# Patient Record
Sex: MALE | Race: WHITE | NOT HISPANIC OR LATINO | Employment: FULL TIME | URBAN - METROPOLITAN AREA
[De-identification: names, ages, dates, MRNs, and addresses within clinical notes are randomized per-mention and may not be internally consistent; named-entity substitution may affect disease eponyms.]

---

## 2018-08-11 ENCOUNTER — OFFICE VISIT (OUTPATIENT)
Dept: URGENT CARE | Facility: CLINIC | Age: 63
End: 2018-08-11

## 2018-08-11 VITALS
SYSTOLIC BLOOD PRESSURE: 140 MMHG | TEMPERATURE: 98 F | BODY MASS INDEX: 34.71 KG/M2 | WEIGHT: 300 LBS | DIASTOLIC BLOOD PRESSURE: 80 MMHG | HEART RATE: 64 BPM | RESPIRATION RATE: 18 BRPM | OXYGEN SATURATION: 100 % | HEIGHT: 78 IN

## 2018-08-11 DIAGNOSIS — S01.01XA LACERATION OF SCALP, INITIAL ENCOUNTER: Primary | ICD-10-CM

## 2018-08-11 DIAGNOSIS — Z23 NEED FOR DIPHTHERIA-TETANUS-PERTUSSIS (TDAP) VACCINE: ICD-10-CM

## 2018-08-11 PROCEDURE — G0382 LEV 3 HOSP TYPE B ED VISIT: HCPCS | Performed by: EMERGENCY MEDICINE

## 2018-08-11 PROCEDURE — 90715 TDAP VACCINE 7 YRS/> IM: CPT

## 2018-08-11 RX ORDER — CELECOXIB 200 MG/1
200 CAPSULE ORAL 2 TIMES DAILY
COMMUNITY

## 2018-08-11 RX ORDER — AMLODIPINE AND VALSARTAN 5; 160 MG/1; MG/1
1 TABLET ORAL 2 TIMES DAILY
COMMUNITY

## 2018-08-11 RX ORDER — ASPIRIN 81 MG/1
81 TABLET ORAL DAILY
COMMUNITY

## 2018-08-11 RX ORDER — CEPHALEXIN 500 MG/1
500 CAPSULE ORAL EVERY 8 HOURS SCHEDULED
Qty: 15 CAPSULE | Refills: 0 | Status: SHIPPED | OUTPATIENT
Start: 2018-08-11 | End: 2018-08-16

## 2018-08-11 RX ORDER — SIMVASTATIN 40 MG
20 TABLET ORAL
COMMUNITY

## 2018-08-11 RX ORDER — OMEPRAZOLE 40 MG/1
40 CAPSULE, DELAYED RELEASE ORAL DAILY
COMMUNITY

## 2018-08-11 NOTE — PROGRESS NOTES
[facility] [facility]        NAME: Kendall Sandoval is a 61 y o  male  : 1955    MRN: [id number]  DATE: 2018  TIME: 4:29 PM    Assessment and Plan   Need for diphtheria-tetanus-pertussis (Tdap) vaccine [Z23]  1  Need for diphtheria-tetanus-pertussis (Tdap) vaccine  TDAP Vaccine greater than or equal to 6yo   2  Laceration of scalp, initial encounter  cephalexin (KEFLEX) 500 mg capsule     Laceration repair  Date/Time: 2018 4:27 PM  Performed by: Mary Leal  Authorized by: Mary Leal   Risks and benefits: risks, benefits and alternatives were discussed  Consent given by: patient  Patient understanding: patient states understanding of the procedure being performed  Patient consent: the patient's understanding of the procedure matches consent given  Procedure consent: procedure consent matches procedure scheduled  Patient identity confirmed: verbally with patient  Body area: head/neck  Location details: scalp  Laceration length: 2 5 cm  Foreign bodies: no foreign bodies  Tendon involvement: superficial  Nerve involvement: none  Vascular damage: no  Anesthesia: local infiltration    Anesthesia:  Local Anesthetic: lidocaine 1% with epinephrine  Anesthetic total: 5 mL    Sedation:  Patient sedated: no    Wound Dehiscence:  Superficial Wound Dehiscence: simple closure      Procedure Details:  Irrigation solution: saline  Amount of cleaning: standard  Debridement: minimal  Degree of undermining: none  Skin closure: staples  Number of sutures: 7 (staples)  Approximation: close  Approximation difficulty: simple  Dressing: antibiotic ointment and pressure dressing  Patient tolerance: Patient tolerated the procedure well with no immediate complications          Patient Instructions     Patient Instructions   Head Injury   AMBULATORY CARE:   A head injury  is most often caused by a blow to the head   This may occur from a fall, bicycle injury, sports injury, being struck in the head, or a motor vehicle accident  Signs and symptoms: You may have an open wound, swelling, or bruising on your head  Right after the injury, you may be confused  Symptoms may last anywhere from a few hours to a few weeks  You may have any of the following:  · Mild to moderate headache    · Dizziness or loss of balance    · Nausea or vomiting    · Change in mood, such as feeling restless or irritable    · Trouble thinking, remembering, or concentrating    · Ringing in the ears or neck pain    · Drowsiness or decreased amount of energy    · Trouble sleeping  Call 911 or have someone else call for any of the following:   · You cannot be woken  · You have a seizure  · You stop responding to others or you faint  · You have blurry or double vision  · Your speech becomes slurred or confused  · You have arm or leg weakness, loss of feeling, or new problems with coordination  · Your pupils are larger than usual or one pupil is a different size than the other  · You have blood or clear fluid coming out of your ears or nose  Seek care immediately if:   · You have repeated or forceful vomiting  · You feel confused  · Your headache gets worse or becomes severe  · You or someone caring for you notices that you are harder to wake than usual   Contact your healthcare provider if:   · Your symptoms last longer than 6 weeks after the injury  · You have questions or concerns about your condition or care  Medicines:   · Acetaminophen  decreases pain  Acetaminophen is available without a doctor's order  Ask how much to take and how often to take it  Follow directions  Acetaminophen can cause liver damage if not taken correctly  · Take your medicine as directed  Contact your healthcare provider if you think your medicine is not helping or if you have side effects  Tell him or her if you are allergic to any medicine  Keep a list of the medicines, vitamins, and herbs you take   Include the amounts, and when and why you take them  Bring the list or the pill bottles to follow-up visits  Carry your medicine list with you in case of an emergency  Self-care:   · Rest  or do quiet activities for 24 to 48 hours  Limit your time watching TV, using the computer, or doing tasks that require a lot of thinking  Slowly return to your normal activities as directed  Do not play sports or do activities that may cause you to get hit in the head  Ask your healthcare provider when you can return to sports  · Apply ice  on your head for 15 to 20 minutes every hour or as directed  Use an ice pack, or put crushed ice in a plastic bag  Cover it with a towel before you apply it to your skin  Ice helps prevent tissue damage and decreases swelling and pain  · Have someone stay with you for 24 hours  or as directed  This person can monitor you for complications and call [phone]  When you are awake the person should ask you a few questions to see if you are thinking clearly  An example would be to ask your name or your address  Prevent another head injury:   · Wear a helmet that fits properly  Do this when you play sports, or ride a bike, scooter, or skateboard  Helmets help decrease your risk of a serious head injury  Talk to your healthcare provider about other ways you can protect yourself if you play sports  · Wear your seat belt every time you are in a car  This helps to decrease your risk for a head injury if you are in a car accident  Follow up with your healthcare provider as directed:  Write down your questions so you remember to ask them during your visits  © 2017 [employer] Information is for End User's use only and may not be sold, redistributed or otherwise used for commercial purposes  All illustrations and images included in CareNotes® are the copyrighted property of [employer]  or [employer]  The above information is an  only   It is not intended as medical advice for individual conditions or treatments  Talk to your doctor, nurse or pharmacist before following any medical regimen to see if it is safe and effective for you  Laceration   AMBULATORY CARE:   A laceration  is an injury to the skin and the soft tissue underneath it  Lacerations happen when you are cut or hit by something  They can happen anywhere on the body  Common symptoms include the following:   · Injury or wound to skin and tissue of any shape size that looks like a cut, tear, or gash    · Edges of the wound may be close together or wide apart    · Pain, bleeding, bruising, or swelling    · Numbness around the wound    · Decreased movement in an area below the wound  Seek care immediately if:   · You have heavy bleeding or bleeding that does not stop after 10 minutes of holding firm, direct pressure over the wound  · Your wound opens up  Contact your healthcare provider if:   · You have a fever or chills  · Your laceration is red, warm, or swollen  · You have red streaks on your skin coming from your wound  · You have white or yellow drainage from the wound that smells bad  · You have pain that gets worse, even after treatment  · You have questions or concerns about your condition or care  Treatment for a laceration  includes care to stop any bleeding  Your healthcare provider will stop the bleeding by applying pressure to the wound  He may need to check your wound for foreign objects and clean it to decrease the chance of infection  Your laceration may be closed with stitches, staples, tissue glue, or medical strips  Ask your healthcare provider if you need a tetanus shot  Medicines:   · Prescription pain medicine  may be given  Ask how to take this medicine safely  · Antibiotics  help treat or prevent a bacterial infection  · Take your medicine as directed  Contact your healthcare provider if you think your medicine is not helping or if you have side effects   Tell him or her if you are allergic to any medicine  Keep a list of the medicines, vitamins, and herbs you take  Include the amounts, and when and why you take them  Bring the list or the pill bottles to follow-up visits  Carry your medicine list with you in case of an emergency  Care for your wound as directed:   · Do not get your wound wet  until your healthcare provider says it is okay  Do not soak your wound in water  Do not go swimming until your healthcare provider says it is okay  Carefully wash the wound with soap and water  Gently pat the area dry or allow it to air dry  · Change your bandages  when they get wet, dirty, or after washing  Apply new, clean bandages as directed  Do not apply elastic bandages or tape too tight  Do not put powders or lotions over your incision  · Apply antibiotic ointment as directed  Your healthcare provider may give you antibiotic ointment to put over your wound if you have stitches  If you have strips of tape over your incision, let them dry up and fall off on their own  If they do not fall off within 14 days, gently remove them  If you have glue over your wound, do not remove or pick at it  If your glue comes off, do not replace it with glue that you have at home  · Check your wound every day for signs of infection such as swelling, redness, or pus  Self-care:   · Apply ice  on your wound for 15 to 20 minutes every hour or as directed  Use an ice pack, or put crushed ice in a plastic bag  Cover it with a towel  Ice helps prevent tissue damage and decreases swelling and pain  · Use a splint as directed  A splint will decrease movement and stress on your wound  It may help it heal faster  A splint may be used for lacerations over joints or areas of your body that bend  Ask your healthcare provider how to apply and remove a splint  · Decrease scarring of your wound  by applying ointments as directed   Do not apply ointments until your healthcare provider says it is okay  You may need to wait until your wound is healed  Ask which ointment to buy and how often to use it  After your wound is healed, use sunscreen over the area when you are out in the sun  You should do this for at least 6 months to 1 year after your injury  Follow up with your healthcare provider as directed:  Write down your questions so you remember to ask them during your visits  © 2017 [employer] Information is for End User's use only and may not be sold, redistributed or otherwise used for commercial purposes  All illustrations and images included in CareNotes® are the copyrighted property of [employer]  or [employer]  The above information is an  only  It is not intended as medical advice for individual conditions or treatments  Talk to your doctor, nurse or pharmacist before following any medical regimen to see if it is safe and effective for you  Follow up with PCP in 3-5 days  Proceed to  ER if symptoms worsen  Chief Complaint     Chief Complaint   Patient presents with    Head Laceration     hit top of head on tracking for a garage and sustained a laceration  no loc         History of Present Illness       Patient complains of laceration to the top of his head after he struck it against a in over hanging garage door track 1 hr ago  No headache no loss of consciousness no neck pain  Review of Systems   Review of Systems   Constitutional: Negative for chills and fever  Musculoskeletal: Negative for arthralgias and joint swelling  Skin: Positive for wound  Negative for color change and rash  Neurological: Negative for numbness           Current Medications       Current Outpatient Prescriptions:     amLODIPine-valsartan (EXFORGE) 5-160 MG per tablet, Take 1 tablet by mouth 2 (two) times a day, Disp: , Rfl:     aspirin (ECOTRIN LOW STRENGTH) 81 mg EC tablet, Take 81 mg by mouth daily, Disp: , Rfl:     celecoxib (CeleBREX) 200 mg capsule, Take 200 mg by mouth 2 (two) times a day, Disp: , Rfl:     omeprazole (PriLOSEC) 40 MG capsule, Take 40 mg by mouth daily, Disp: , Rfl:     simvastatin (ZOCOR) 40 mg tablet, Take 20 mg by mouth daily at bedtime  , Disp: , Rfl:     cephalexin (KEFLEX) 500 mg capsule, Take 1 capsule (500 mg total) by mouth every 8 (eight) hours for 5 days, Disp: 15 capsule, Rfl: 0    Current Allergies     Allergies as of 08/11/2018 - Reviewed 08/11/2018   Allergen Reaction Noted    Iodine Hives 08/11/2018    Shellfish-derived products Hives 08/11/2018            The following portions of the patient's history were reviewed and updated as appropriate: allergies, current medications, past family history, past medical history, past social history, past surgical history and problem list      Past Medical History:   Diagnosis Date    Arthritis     GERD (gastroesophageal reflux disease)     High cholesterol     Hypertension        Past Surgical History:   Procedure Laterality Date    HERNIA REPAIR      KNEE ARTHROSCOPY      x4    REPLACEMENT TOTAL KNEE Left        Family History   Problem Relation Age of Onset    Heart disease Mother     Diabetes Father          Medications have been verified  Objective   /80   Pulse 64   Temp 98 °F (36 7 °C) (Tympanic)   Resp 18   Ht 6' 8" (2 032 m)   Wt 136 kg (300 lb)   SpO2 100%   BMI 32 96 kg/m²        Physical Exam     Physical Exam   Constitutional: He is oriented to person, place, and time  He appears well-developed and well-nourished  No distress  Neck: Neck supple  Cardiovascular: Normal rate and regular rhythm  Pulmonary/Chest: Effort normal and breath sounds normal    Musculoskeletal: He exhibits no tenderness  Neurological: He is alert and oriented to person, place, and time  Skin: Skin is warm and dry  No rash noted  There is erythema  Nursing note and vitals reviewed

## 2018-08-11 NOTE — PATIENT INSTRUCTIONS
Head Injury   AMBULATORY CARE:   A head injury  is most often caused by a blow to the head  This may occur from a fall, bicycle injury, sports injury, being struck in the head, or a motor vehicle accident  Signs and symptoms: You may have an open wound, swelling, or bruising on your head  Right after the injury, you may be confused  Symptoms may last anywhere from a few hours to a few weeks  You may have any of the following:  · Mild to moderate headache    · Dizziness or loss of balance    · Nausea or vomiting    · Change in mood, such as feeling restless or irritable    · Trouble thinking, remembering, or concentrating    · Ringing in the ears or neck pain    · Drowsiness or decreased amount of energy    · Trouble sleeping  Call 911 or have someone else call for any of the following:   · You cannot be woken  · You have a seizure  · You stop responding to others or you faint  · You have blurry or double vision  · Your speech becomes slurred or confused  · You have arm or leg weakness, loss of feeling, or new problems with coordination  · Your pupils are larger than usual or one pupil is a different size than the other  · You have blood or clear fluid coming out of your ears or nose  Seek care immediately if:   · You have repeated or forceful vomiting  · You feel confused  · Your headache gets worse or becomes severe  · You or someone caring for you notices that you are harder to wake than usual   Contact your healthcare provider if:   · Your symptoms last longer than 6 weeks after the injury  · You have questions or concerns about your condition or care  Medicines:   · Acetaminophen  decreases pain  Acetaminophen is available without a doctor's order  Ask how much to take and how often to take it  Follow directions  Acetaminophen can cause liver damage if not taken correctly  · Take your medicine as directed    Contact your healthcare provider if you think your medicine is not helping or if you have side effects  Tell him or her if you are allergic to any medicine  Keep a list of the medicines, vitamins, and herbs you take  Include the amounts, and when and why you take them  Bring the list or the pill bottles to follow-up visits  Carry your medicine list with you in case of an emergency  Self-care:   · Rest  or do quiet activities for 24 to 48 hours  Limit your time watching TV, using the computer, or doing tasks that require a lot of thinking  Slowly return to your normal activities as directed  Do not play sports or do activities that may cause you to get hit in the head  Ask your healthcare provider when you can return to sports  · Apply ice  on your head for 15 to 20 minutes every hour or as directed  Use an ice pack, or put crushed ice in a plastic bag  Cover it with a towel before you apply it to your skin  Ice helps prevent tissue damage and decreases swelling and pain  · Have someone stay with you for 24 hours  or as directed  This person can monitor you for complications and call [phone]  When you are awake the person should ask you a few questions to see if you are thinking clearly  An example would be to ask your name or your address  Prevent another head injury:   · Wear a helmet that fits properly  Do this when you play sports, or ride a bike, scooter, or skateboard  Helmets help decrease your risk of a serious head injury  Talk to your healthcare provider about other ways you can protect yourself if you play sports  · Wear your seat belt every time you are in a car  This helps to decrease your risk for a head injury if you are in a car accident  Follow up with your healthcare provider as directed:  Write down your questions so you remember to ask them during your visits  © 2017 [employer] Information is for End User's use only and may not be sold, redistributed or otherwise used for commercial purposes   All illustrations and images included in [employer] are the copyrighted property of [employer]  or [employer]  The above information is an  only  It is not intended as medical advice for individual conditions or treatments  Talk to your doctor, nurse or pharmacist before following any medical regimen to see if it is safe and effective for you  Laceration   AMBULATORY CARE:   A laceration  is an injury to the skin and the soft tissue underneath it  Lacerations happen when you are cut or hit by something  They can happen anywhere on the body  Common symptoms include the following:   · Injury or wound to skin and tissue of any shape size that looks like a cut, tear, or gash    · Edges of the wound may be close together or wide apart    · Pain, bleeding, bruising, or swelling    · Numbness around the wound    · Decreased movement in an area below the wound  Seek care immediately if:   · You have heavy bleeding or bleeding that does not stop after 10 minutes of holding firm, direct pressure over the wound  · Your wound opens up  Contact your healthcare provider if:   · You have a fever or chills  · Your laceration is red, warm, or swollen  · You have red streaks on your skin coming from your wound  · You have white or yellow drainage from the wound that smells bad  · You have pain that gets worse, even after treatment  · You have questions or concerns about your condition or care  Treatment for a laceration  includes care to stop any bleeding  Your healthcare provider will stop the bleeding by applying pressure to the wound  He may need to check your wound for foreign objects and clean it to decrease the chance of infection  Your laceration may be closed with stitches, staples, tissue glue, or medical strips  Ask your healthcare provider if you need a tetanus shot  Medicines:   · Prescription pain medicine  may be given  Ask how to take this medicine safely       · Antibiotics  help treat or prevent a bacterial infection  · Take your medicine as directed  Contact your healthcare provider if you think your medicine is not helping or if you have side effects  Tell him or her if you are allergic to any medicine  Keep a list of the medicines, vitamins, and herbs you take  Include the amounts, and when and why you take them  Bring the list or the pill bottles to follow-up visits  Carry your medicine list with you in case of an emergency  Care for your wound as directed:   · Do not get your wound wet  until your healthcare provider says it is okay  Do not soak your wound in water  Do not go swimming until your healthcare provider says it is okay  Carefully wash the wound with soap and water  Gently pat the area dry or allow it to air dry  · Change your bandages  when they get wet, dirty, or after washing  Apply new, clean bandages as directed  Do not apply elastic bandages or tape too tight  Do not put powders or lotions over your incision  · Apply antibiotic ointment as directed  Your healthcare provider may give you antibiotic ointment to put over your wound if you have stitches  If you have strips of tape over your incision, let them dry up and fall off on their own  If they do not fall off within 14 days, gently remove them  If you have glue over your wound, do not remove or pick at it  If your glue comes off, do not replace it with glue that you have at home  · Check your wound every day for signs of infection such as swelling, redness, or pus  Self-care:   · Apply ice  on your wound for 15 to 20 minutes every hour or as directed  Use an ice pack, or put crushed ice in a plastic bag  Cover it with a towel  Ice helps prevent tissue damage and decreases swelling and pain  · Use a splint as directed  A splint will decrease movement and stress on your wound  It may help it heal faster  A splint may be used for lacerations over joints or areas of your body that bend   Ask your healthcare provider how to apply and remove a splint  · Decrease scarring of your wound  by applying ointments as directed  Do not apply ointments until your healthcare provider says it is okay  You may need to wait until your wound is healed  Ask which ointment to buy and how often to use it  After your wound is healed, use sunscreen over the area when you are out in the sun  You should do this for at least 6 months to 1 year after your injury  Follow up with your healthcare provider as directed:  Write down your questions so you remember to ask them during your visits  © 2017 [address] [employer] Information is for End User's use only and may not be sold, redistributed or otherwise used for commercial purposes  All illustrations and images included in CareNotes® are the copyrighted property of [employer]  or [employer]  The above information is an  only  It is not intended as medical advice for individual conditions or treatments  Talk to your doctor, nurse or pharmacist before following any medical regimen to see if it is safe and effective for you

## 2023-02-28 ENCOUNTER — HOSPITAL ENCOUNTER (EMERGENCY)
Facility: HOSPITAL | Age: 68
Discharge: HOME | End: 2023-02-28
Attending: EMERGENCY MEDICINE

## 2023-02-28 VITALS
SYSTOLIC BLOOD PRESSURE: 138 MMHG | RESPIRATION RATE: 16 BRPM | OXYGEN SATURATION: 93 % | TEMPERATURE: 98.3 F | BODY MASS INDEX: 34.71 KG/M2 | DIASTOLIC BLOOD PRESSURE: 75 MMHG | HEART RATE: 63 BPM | HEIGHT: 78 IN | WEIGHT: 300 LBS

## 2023-02-28 DIAGNOSIS — N39.0 URINARY TRACT INFECTION ASSOCIATED WITH INDWELLING URETHRAL CATHETER, INITIAL ENCOUNTER (HCC): Primary | ICD-10-CM

## 2023-02-28 DIAGNOSIS — T83.511A URINARY TRACT INFECTION ASSOCIATED WITH INDWELLING URETHRAL CATHETER, INITIAL ENCOUNTER (HCC): Primary | ICD-10-CM

## 2023-02-28 LAB
BACTERIA UR QL AUTO: ABNORMAL /HPF
BILIRUB UR QL STRIP: NEGATIVE
CLARITY UR: ABNORMAL
COLOR UR: YELLOW
GLUCOSE UR STRIP-MCNC: NEGATIVE MG/DL
HGB UR QL STRIP.AUTO: ABNORMAL
KETONES UR STRIP-MCNC: ABNORMAL MG/DL
LEUKOCYTE ESTERASE UR QL STRIP: ABNORMAL
NITRITE UR QL STRIP: POSITIVE
NON-SQ EPI CELLS URNS QL MICRO: ABNORMAL /HPF
PH UR STRIP.AUTO: 5.5 [PH]
PROT UR STRIP-MCNC: ABNORMAL MG/DL
RBC #/AREA URNS AUTO: ABNORMAL /HPF
SP GR UR STRIP.AUTO: 1.02 (ref 1–1.03)
UROBILINOGEN UR QL STRIP.AUTO: 0.2 E.U./DL
WBC #/AREA URNS AUTO: ABNORMAL /HPF

## 2023-02-28 RX ORDER — CEPHALEXIN 500 MG/1
500 CAPSULE ORAL EVERY 6 HOURS SCHEDULED
Qty: 40 CAPSULE | Refills: 0 | Status: SHIPPED | OUTPATIENT
Start: 2023-02-28 | End: 2023-03-10

## 2023-02-28 RX ORDER — CEPHALEXIN 250 MG/1
500 CAPSULE ORAL ONCE
Status: COMPLETED | OUTPATIENT
Start: 2023-02-28 | End: 2023-02-28

## 2023-02-28 RX ADMIN — CEPHALEXIN 500 MG: 250 CAPSULE ORAL at 23:15

## 2023-03-01 NOTE — ED PROVIDER NOTES
History  Chief Complaint   Patient presents with   • Urinary Retention     Pt had catheter placed for urinary retention for two weeks removed today at 0900 and has not been able to urinate since  Pt reports 10/10 pain and having bladder spasms  Patient had right knee surgery done 2 weeks ago  Postoperatively had urinary retention  Had a Pereira catheter placed  Pereira catheter was just removed today  Only able to go small amounts  No fevers or chills  Noted some blood  No back pain  Complains of lower abdominal discomfort  No rash  History provided by:  Patient   used: No    Difficulty Urinating  Presenting symptoms: dysuria    Presenting symptoms comment:  Unable to urinate easily  Relieved by:  Nothing  Worsened by:  Nothing  Ineffective treatments:  None tried  Associated symptoms: abdominal pain and urinary retention    Associated symptoms: no diarrhea, no fever, no flank pain, no genital itching, no hematuria, no nausea, no urinary frequency, no urinary hesitation and no vomiting        Prior to Admission Medications   Prescriptions Last Dose Informant Patient Reported? Taking?    amLODIPine-valsartan (EXFORGE) 5-160 MG per tablet   Yes No   Sig: Take 1 tablet by mouth 2 (two) times a day   aspirin (ECOTRIN LOW STRENGTH) 81 mg EC tablet   Yes No   Sig: Take 81 mg by mouth daily   celecoxib (CeleBREX) 200 mg capsule   Yes No   Sig: Take 200 mg by mouth 2 (two) times a day   omeprazole (PriLOSEC) 40 MG capsule   Yes No   Sig: Take 40 mg by mouth daily   simvastatin (ZOCOR) 40 mg tablet   Yes No   Sig: Take 20 mg by mouth daily at bedtime        Facility-Administered Medications: None       Past Medical History:   Diagnosis Date   • Arthritis    • GERD (gastroesophageal reflux disease)    • High cholesterol    • Hypertension        Past Surgical History:   Procedure Laterality Date   • HERNIA REPAIR     • KNEE ARTHROSCOPY      x4   • REPLACEMENT TOTAL KNEE Left        Family History   Problem Relation Age of Onset   • Heart disease Mother    • Diabetes Father      I have reviewed and agree with the history as documented  E-Cigarette/Vaping   • E-Cigarette Use Never User      E-Cigarette/Vaping Substances     Social History     Tobacco Use   • Smoking status: Never   • Smokeless tobacco: Never   Vaping Use   • Vaping Use: Never used   Substance Use Topics   • Alcohol use: Not Currently   • Drug use: Never       Review of Systems   Constitutional: Negative for chills and fever  HENT: Negative for ear pain, hearing loss, sore throat, trouble swallowing and voice change  Eyes: Negative for pain and discharge  Respiratory: Negative for cough, shortness of breath and wheezing  Cardiovascular: Negative for chest pain and palpitations  Gastrointestinal: Positive for abdominal pain  Negative for blood in stool, constipation, diarrhea, nausea and vomiting  Genitourinary: Positive for dysuria  Negative for flank pain, frequency, hematuria and hesitancy  Musculoskeletal: Negative for joint swelling, neck pain and neck stiffness  Skin: Negative for rash and wound  Neurological: Negative for dizziness, seizures, syncope, facial asymmetry and headaches  Psychiatric/Behavioral: Negative for hallucinations, self-injury and suicidal ideas  All other systems reviewed and are negative  Physical Exam  Physical Exam  Vitals and nursing note reviewed  Constitutional:       General: He is not in acute distress  Appearance: He is well-developed  HENT:      Head: Normocephalic and atraumatic  Right Ear: External ear normal       Left Ear: External ear normal    Eyes:      General: No scleral icterus  Right eye: No discharge  Left eye: No discharge  Extraocular Movements: Extraocular movements intact  Conjunctiva/sclera: Conjunctivae normal    Cardiovascular:      Rate and Rhythm: Normal rate and regular rhythm        Heart sounds: Normal heart sounds  No murmur heard  Pulmonary:      Effort: Pulmonary effort is normal       Breath sounds: Normal breath sounds  No wheezing or rales  Abdominal:      General: Bowel sounds are normal  There is no distension  Palpations: Abdomen is soft  Tenderness: There is abdominal tenderness  There is no guarding or rebound  Comments: Tender in the suprapubic region  Musculoskeletal:         General: No deformity  Normal range of motion  Cervical back: Normal range of motion and neck supple  Comments: Knee wound with Steri-Strips in place  There is no fluctuance or discharge  There is no erythema  There is no induration  Skin:     General: Skin is warm and dry  Findings: No rash  Neurological:      General: No focal deficit present  Mental Status: He is alert and oriented to person, place, and time  Cranial Nerves: No cranial nerve deficit  Psychiatric:         Mood and Affect: Mood normal          Behavior: Behavior normal          Thought Content:  Thought content normal          Judgment: Judgment normal          Vital Signs  ED Triage Vitals   Temperature Pulse Respirations Blood Pressure SpO2   02/28/23 2240 02/28/23 2240 02/28/23 2240 02/28/23 2300 02/28/23 2240   98 3 °F (36 8 °C) 84 21 142/82 99 %      Temp Source Heart Rate Source Patient Position - Orthostatic VS BP Location FiO2 (%)   02/28/23 2240 02/28/23 2240 02/28/23 2240 02/28/23 2240 --   Oral Monitor Sitting Left arm       Pain Score       02/28/23 2240       10 - Worst Possible Pain           Vitals:    02/28/23 2240 02/28/23 2300 02/28/23 2301   BP:  142/82 168/64   Pulse: 84 65    Patient Position - Orthostatic VS: Sitting Lying          Visual Acuity      ED Medications  Medications   cephalexin (KEFLEX) capsule 500 mg (has no administration in time range)       Diagnostic Studies  Results Reviewed     Procedure Component Value Units Date/Time    UA w Reflex to Microscopic w Reflex to Culture [[id number]]  (Abnormal) Collected: 02/28/23 2256    Lab Status: Final result Specimen: Urine, Clean Catch Updated: 02/28/23 2307     Color, UA Yellow     Clarity, UA Cloudy     Specific Gravity, UA 1 025     pH, UA 5 5     Leukocytes, UA Moderate     Nitrite, UA Positive     Protein, UA Trace mg/dl      Glucose, UA Negative mg/dl      Ketones, UA Trace mg/dl      Urobilinogen, UA 0 2 E U /dl      Bilirubin, UA Negative     Occult Blood, UA Large    Urine Microscopic [[id number]] Collected: 02/28/23 2256    Lab Status: In process Specimen: Urine, Clean Catch Updated: 02/28/23 2307                 No orders to display              Procedures  Procedures         ED Course  ED Course as of 02/28/23 2312 Tue Feb 28, 2023 2303 Patient seen after catheter placed  Approximately 800 cc of darkish yellow slightly cloudy urine collected  2310 Patient with urinary retention with over 800 cc of urine collected  UTI per urinalysis  Will start on Keflex  Is to follow-up with his urologist in the morning  Medical Decision Making  Amount and/or Complexity of Data Reviewed  Independent Historian: spouse  External Data Reviewed: notes  Labs: ordered  Decision-making details documented in ED Course  Risk  OTC drugs  Prescription drug management  Disposition  Final diagnoses:   Urinary tract infection associated with indwelling urethral catheter, initial encounter [facility])     Time reflects when diagnosis was documented in both MDM as applicable and the Disposition within this note     Time User Action Codes Description Comment    2/28/2023 11:11 PM Abel Gentile Add [Q41 939O,  N39 0] Urinary tract infection associated with indwelling urethral catheter, initial encounter [facility])       ED Disposition     ED Disposition   Discharge    Condition   Stable    Date/Time   Tue Feb 28, 2023 11:11 PM    [address] discharge to home/self care  Follow-up Information    None         Patient's Medications   Discharge Prescriptions    CEPHALEXIN (KEFLEX) 500 MG CAPSULE    Take 1 capsule (500 mg total) by mouth every 6 (six) hours for 10 days       Start Date: 2/28/2023 End Date: 3/10/2023       Order Dose: 500 mg       Quantity: 40 capsule    Refills: 0       No discharge procedures on file      PDMP Review     None          ED Provider  Electronically Signed by           Yayo Lyons MD  02/28/23 3732

## 2023-03-03 LAB — BACTERIA UR CULT: ABNORMAL

## 2023-03-09 ENCOUNTER — HOSPITAL ENCOUNTER (EMERGENCY)
Facility: HOSPITAL | Age: 68
Discharge: HOME | End: 2023-03-09
Attending: EMERGENCY MEDICINE

## 2023-03-09 VITALS
BODY MASS INDEX: 34.71 KG/M2 | RESPIRATION RATE: 18 BRPM | DIASTOLIC BLOOD PRESSURE: 95 MMHG | WEIGHT: 300 LBS | TEMPERATURE: 97.8 F | SYSTOLIC BLOOD PRESSURE: 157 MMHG | HEIGHT: 78 IN | OXYGEN SATURATION: 98 % | HEART RATE: 66 BPM

## 2023-03-09 DIAGNOSIS — Z46.6 ENCOUNTER FOR FOLEY CATHETER REPLACEMENT: ICD-10-CM

## 2023-03-09 DIAGNOSIS — R33.9 URINARY RETENTION: Primary | ICD-10-CM

## 2023-03-09 RX ORDER — LIDOCAINE HYDROCHLORIDE 20 MG/ML
1 JELLY TOPICAL ONCE
Status: COMPLETED | OUTPATIENT
Start: 2023-03-09 | End: 2023-03-09

## 2023-03-09 RX ADMIN — LIDOCAINE HYDROCHLORIDE 1 APPLICATION.: 20 JELLY TOPICAL at 05:55

## 2023-03-09 NOTE — ED PROVIDER NOTES
History  Chief Complaint   Patient presents with   • Urinary Retention     Had catheter removed and a cystoscopy yesterday by Dr Abby Ibanez, developed urinary retention over night  Reports dribbling with a small amount of blood and supra pubic pain  Patient is a 71-year-old male presents the emergency department complaining of inability to urinate following cystoscopy and Pereira catheter removal yesterday  Patient reports suprapubic pressure and small drops of blood in the urine he was able to produce which he was told to expect after the cystoscopy  Currently on keflex no fevers  History provided by:  Patient and spouse  Difficulty Urinating  Presenting symptoms: no dysuria and no penile discharge    Associated symptoms: abdominal pain and hematuria    Associated symptoms: no diarrhea, no fever, no nausea, no urinary frequency and no vomiting        Prior to Admission Medications   Prescriptions Last Dose Informant Patient Reported? Taking?    amLODIPine-valsartan (EXFORGE) 5-160 MG per tablet   Yes No   Sig: Take 1 tablet by mouth 2 (two) times a day   aspirin (ECOTRIN LOW STRENGTH) 81 mg EC tablet   Yes No   Sig: Take 81 mg by mouth daily   celecoxib (CeleBREX) 200 mg capsule   Yes No   Sig: Take 200 mg by mouth 2 (two) times a day   cephalexin (KEFLEX) 500 mg capsule   No No   Sig: Take 1 capsule (500 mg total) by mouth every 6 (six) hours for 10 days   omeprazole (PriLOSEC) 40 MG capsule   Yes No   Sig: Take 40 mg by mouth daily   simvastatin (ZOCOR) 40 mg tablet   Yes No   Sig: Take 20 mg by mouth daily at bedtime        Facility-Administered Medications: None       Past Medical History:   Diagnosis Date   • Arthritis    • GERD (gastroesophageal reflux disease)    • High cholesterol    • Hypertension        Past Surgical History:   Procedure Laterality Date   • HERNIA REPAIR     • KNEE ARTHROSCOPY      x4   • REPLACEMENT TOTAL KNEE Left        Family History   Problem Relation Age of Onset   • Heart disease Mother    • Diabetes Father      I have reviewed and agree with the history as documented  E-Cigarette/Vaping   • E-Cigarette Use Never User      E-Cigarette/Vaping Substances     Social History     Tobacco Use   • Smoking status: Never   • Smokeless tobacco: Never   Vaping Use   • Vaping Use: Never used   Substance Use Topics   • Alcohol use: Not Currently   • Drug use: Never       Review of Systems   Constitutional: Negative for activity change, appetite change, chills, fatigue and fever  HENT: Negative for congestion, ear pain, rhinorrhea and sore throat  Eyes: Negative for discharge, redness and visual disturbance  Respiratory: Negative for cough, chest tightness, shortness of breath and wheezing  Cardiovascular: Negative for chest pain and palpitations  Gastrointestinal: Positive for abdominal pain  Negative for constipation, diarrhea, nausea and vomiting  Endocrine: Negative for polydipsia and polyuria  Genitourinary: Positive for difficulty urinating, hematuria and urgency  Negative for dysuria, frequency and penile discharge  Musculoskeletal: Negative for arthralgias and myalgias  Skin: Negative for color change, pallor and rash  Neurological: Negative for dizziness, weakness, light-headedness, numbness and headaches  Hematological: Negative for adenopathy  Does not bruise/bleed easily  All other systems reviewed and are negative  Physical Exam  Physical Exam  Vitals and nursing note reviewed  Constitutional:       Appearance: He is well-developed  HENT:      Head: Normocephalic and atraumatic  Right Ear: External ear normal       Left Ear: External ear normal       Nose: Nose normal    Eyes:      Conjunctiva/sclera: Conjunctivae normal       Pupils: Pupils are equal, round, and reactive to light  Cardiovascular:      Rate and Rhythm: Normal rate and regular rhythm  Heart sounds: Normal heart sounds     Pulmonary:      Effort: Pulmonary effort is normal  No respiratory distress  Breath sounds: Normal breath sounds  No wheezing or rales  Chest:      Chest wall: No tenderness  Abdominal:      General: Bowel sounds are normal  There is no distension  Palpations: Abdomen is soft  Tenderness: There is abdominal tenderness in the suprapubic area  There is no guarding  Musculoskeletal:         General: Normal range of motion  Cervical back: Normal range of motion and neck supple  Skin:     General: Skin is warm and dry  Neurological:      Mental Status: He is alert and oriented to person, place, and time  Cranial Nerves: No cranial nerve deficit  Sensory: No sensory deficit  Vital Signs  ED Triage Vitals [03/09/23 0550]   Temperature Pulse Respirations Blood Pressure SpO2   97 8 °F (36 6 °C) 62 19 157/95 97 %      Temp Source Heart Rate Source Patient Position - Orthostatic VS BP Location FiO2 (%)   Temporal Right;Radial Lying Right arm --      Pain Score       8           Vitals:    03/09/23 0550   BP: 157/95   Pulse: 62   Patient Position - Orthostatic VS: Lying         Visual Acuity      ED Medications  Medications   lidocaine (URO-JET) 2 % urethral/mucosal gel 1 application  (has no administration in time range)       Diagnostic Studies  Results Reviewed     None                 No orders to display              Procedures  Procedures         ED Course                                             Medical Decision Making  Patient is afebrile nontoxic well-appearing clinically and hemodynamically stable in the emergency department  History and examination consistent with acute urinary retention post Pereira catheter removal and cystoscopy  Pereira catheter replaced in the ED was draining well patient had relief of symptoms and felt improved  Patient to continue antibiotics for now and maintain Pereira catheter in place and follow-up promptly with his urologist for further evaluation and management   return precautions and anticipatory guidance discussed  Encounter for Pereira catheter replacement: acute illness or injury  Urinary retention: acute illness or injury  Risk  Prescription drug management  Disposition  Final diagnoses:   Urinary retention   Encounter for Pereira catheter replacement     Time reflects when diagnosis was documented in both MDM as applicable and the Disposition within this note     Time User Action Codes Description Comment    3/9/2023  5:47 AM Olivier Leigh [R33 9] Urinary retention     3/9/2023  5:47 AM Olivier Leigh [Z46 6] Encounter for Pereira catheter replacement       ED Disposition     ED Disposition   Discharge    Condition   Stable    Date/Time   Thu Mar 9, 2023  5:47 AM    Comment   Debora Mcdowell discharge to home/self care  Follow-up Information     Follow up With Specialties Details Why Contact Info    Mando Elizondo MD Urology Call today  [address]  [phone]            Patient's Medications   Discharge Prescriptions    No medications on file       No discharge procedures on file      PDMP Review     None          ED Provider  Electronically Signed by           Shukri Dubois DO  03/09/23 7363

## 2023-03-18 ENCOUNTER — HOSPITAL ENCOUNTER (EMERGENCY)
Facility: HOSPITAL | Age: 68
Discharge: HOME | End: 2023-03-18
Attending: EMERGENCY MEDICINE | Admitting: EMERGENCY MEDICINE

## 2023-03-18 VITALS
HEART RATE: 72 BPM | RESPIRATION RATE: 16 BRPM | TEMPERATURE: 98 F | OXYGEN SATURATION: 98 % | BODY MASS INDEX: 33.8 KG/M2 | DIASTOLIC BLOOD PRESSURE: 88 MMHG | SYSTOLIC BLOOD PRESSURE: 128 MMHG | WEIGHT: 300 LBS

## 2023-03-18 DIAGNOSIS — T83.9XXA PROBLEM WITH FOLEY CATHETER, INITIAL ENCOUNTER (HCC): Primary | ICD-10-CM

## 2023-03-18 NOTE — ED PROVIDER NOTES
History  Chief Complaint   Patient presents with   • Urinary Catheter Problem     Pt has chronic indwelling cath, reports cath was exchanged by urology yesterday  Pt reports he is voiding around cath, nothing draining from cath today      Patient is a 44-year-old male presenting to the emergency department complaining of Pereira catheter malfunction, patient reports he has had a Pereira catheter in place for several weeks secondary to BPH, he had the catheter changed with his urologist yesterday, he has had no drainage from it today, he denies any pain, no hematuria, no fever, no nausea or vomiting          Prior to Admission Medications   Prescriptions Last Dose Informant Patient Reported? Taking? amLODIPine-valsartan (EXFORGE) 5-160 MG per tablet   Yes No   Sig: Take 1 tablet by mouth 2 (two) times a day   aspirin (ECOTRIN LOW STRENGTH) 81 mg EC tablet   Yes No   Sig: Take 81 mg by mouth daily   celecoxib (CeleBREX) 200 mg capsule   Yes No   Sig: Take 200 mg by mouth 2 (two) times a day   omeprazole (PriLOSEC) 40 MG capsule   Yes No   Sig: Take 40 mg by mouth daily   simvastatin (ZOCOR) 40 mg tablet   Yes No   Sig: Take 20 mg by mouth daily at bedtime        Facility-Administered Medications: None       Past Medical History:   Diagnosis Date   • Arthritis    • GERD (gastroesophageal reflux disease)    • High cholesterol    • Hypertension        Past Surgical History:   Procedure Laterality Date   • HERNIA REPAIR     • KNEE ARTHROSCOPY      x4   • REPLACEMENT TOTAL KNEE Left        Family History   Problem Relation Age of Onset   • Heart disease Mother    • Diabetes Father      I have reviewed and agree with the history as documented      E-Cigarette/Vaping   • E-Cigarette Use Never User      E-Cigarette/Vaping Substances     Social History     Tobacco Use   • Smoking status: Never   • Smokeless tobacco: Never   Vaping Use   • Vaping Use: Never used   Substance Use Topics   • Alcohol use: Not Currently   • Drug use: Never       Review of Systems   Constitutional: Negative  HENT: Negative  Eyes: Negative  Respiratory: Negative  Cardiovascular: Negative  Gastrointestinal: Negative  Endocrine: Negative  Genitourinary: Positive for difficulty urinating  Musculoskeletal: Negative  Skin: Negative  Allergic/Immunologic: Negative  Neurological: Negative  Hematological: Negative  Psychiatric/Behavioral: Negative  Physical Exam  Physical Exam  Constitutional:       Appearance: He is well-developed  HENT:      Head: Normocephalic and atraumatic  Eyes:      Conjunctiva/sclera: Conjunctivae normal       Pupils: Pupils are equal, round, and reactive to light  Cardiovascular:      Rate and Rhythm: Normal rate  Pulmonary:      Effort: Pulmonary effort is normal    Abdominal:      Palpations: Abdomen is soft  Musculoskeletal:         General: Normal range of motion  Cervical back: Normal range of motion and neck supple  Skin:     General: Skin is warm and dry  Neurological:      Mental Status: He is alert and oriented to person, place, and time           Vital Signs  ED Triage Vitals [03/18/23 1704]   Temperature Pulse Respirations Blood Pressure SpO2   98 °F (36 7 °C) 70 16 135/84 98 %      Temp src Heart Rate Source Patient Position - Orthostatic VS BP Location FiO2 (%)   -- -- Lying Right arm --      Pain Score       3           Vitals:    03/18/23 1704 03/18/23 1738   BP: 135/84 128/88   Pulse: 70 72   Patient Position - Orthostatic VS: Lying          Visual Acuity      ED Medications  Medications - No data to display    Diagnostic Studies  Results Reviewed     None                 No orders to display              Procedures  Procedures         ED Course                                             Medical Decision Making  Patient is a 80-year-old male presenting to the emergency department complaining of Pereira catheter malfunction, he has had the Pereira catheter several weeks secondary to urinary retention from BPH, the Pereira catheter was changed at his urologist office yesterday and initially functioning but today has had no urine drainage, nursing staff was able to flush the Pereira catheter prior to my initial evaluation, was able to have good urine flow and patient reports feeling much better on evaluation, nursing staff taught patient and his wife how to flush the Pereira catheter in the future so that if it becomes clogged again he is able to care for it at home, advise close follow-up with his urologist or return if symptoms worsen, patient and his spouse acknowledge understanding and agreement with this plan    Problem with Pereira catheter, initial encounter [facility]): acute illness or injury      Disposition  Final diagnoses:   Problem with Pereira catheter, initial encounter [facility])     Time reflects when diagnosis was documented in both MDM as applicable and the Disposition within this note     Time User Action Codes Description Comment    3/18/2023  5:24 PM Tanja, Colt Landeros  9XXA] Problem with Pereira catheter, initial encounter [facility])       ED Disposition     ED Disposition   Discharge    Condition   Stable    Date/Time   Sat Mar 18, 2023  5:24 PM    Comment   Timo Valero discharge to home/self care                 Follow-up Information     Follow up With Specialties Details Why Mireille 98, PA-C   As needed [address]  [phone]            Discharge Medication List as of 3/18/2023  5:25 PM      CONTINUE these medications which have NOT CHANGED    Details   amLODIPine-valsartan (EXFORGE) 5-160 MG per tablet Take 1 tablet by mouth 2 (two) times a day, Historical Med      aspirin (ECOTRIN LOW STRENGTH) 81 mg EC tablet Take 81 mg by mouth daily, Historical Med      celecoxib (CeleBREX) 200 mg capsule Take 200 mg by mouth 2 (two) times a day, Historical Med      omeprazole (PriLOSEC) 40 MG capsule Take 40 mg by mouth daily, Historical Med      simvastatin (ZOCOR) 40 mg tablet Take 20 mg by mouth daily at bedtime  , Historical Med             No discharge procedures on file      PDMP Review     None          ED Provider  Electronically Signed by           Aliyah Odell DO  03/18/23 4029

## 2023-03-18 NOTE — ED NOTES
Educate pt and spouse on hand irrigation of rick catheter  Wife return demonstrated irrigation  Pt verbalized understanding of need to return to ed if home irrigation ineffective        Ralph Ross RN  03/18/23 7527